# Patient Record
Sex: FEMALE | Race: WHITE | Employment: OTHER | URBAN - METROPOLITAN AREA
[De-identification: names, ages, dates, MRNs, and addresses within clinical notes are randomized per-mention and may not be internally consistent; named-entity substitution may affect disease eponyms.]

---

## 2017-01-26 PROCEDURE — 82570 ASSAY OF URINE CREATININE: CPT | Performed by: INTERNAL MEDICINE

## 2017-01-26 PROCEDURE — 82043 UR ALBUMIN QUANTITATIVE: CPT | Performed by: INTERNAL MEDICINE

## 2017-02-15 PROBLEM — E11.9 DIABETES MELLITUS TYPE 2 WITHOUT RETINOPATHY (HCC): Status: ACTIVE | Noted: 2017-02-15

## 2017-04-25 PROCEDURE — 86141 C-REACTIVE PROTEIN HS: CPT | Performed by: INTERNAL MEDICINE

## 2017-04-25 PROCEDURE — 86606 ASPERGILLUS ANTIBODY: CPT | Performed by: INTERNAL MEDICINE

## 2017-04-25 PROCEDURE — 86331 IMMUNODIFFUSION OUCHTERLONY: CPT | Performed by: INTERNAL MEDICINE

## 2017-05-18 RX ORDER — CHOLECALCIFEROL (VITAMIN D3) 50 MCG
TABLET ORAL DAILY
COMMUNITY

## 2017-05-18 RX ORDER — BIOTIN 1 MG
1000 TABLET ORAL DAILY
COMMUNITY
End: 2018-02-28

## 2017-06-07 ENCOUNTER — APPOINTMENT (OUTPATIENT)
Dept: GENERAL RADIOLOGY | Facility: HOSPITAL | Age: 77
End: 2017-06-07
Attending: INTERNAL MEDICINE
Payer: MEDICARE

## 2017-06-07 ENCOUNTER — SURGERY (OUTPATIENT)
Age: 77
End: 2017-06-07

## 2017-06-07 ENCOUNTER — HOSPITAL ENCOUNTER (OUTPATIENT)
Facility: HOSPITAL | Age: 77
Setting detail: HOSPITAL OUTPATIENT SURGERY
Discharge: HOME | End: 2017-06-07
Attending: INTERNAL MEDICINE | Admitting: INTERNAL MEDICINE
Payer: MEDICARE

## 2017-06-07 VITALS
TEMPERATURE: 98 F | BODY MASS INDEX: 35.88 KG/M2 | RESPIRATION RATE: 16 BRPM | WEIGHT: 195 LBS | DIASTOLIC BLOOD PRESSURE: 46 MMHG | SYSTOLIC BLOOD PRESSURE: 118 MMHG | OXYGEN SATURATION: 96 % | HEART RATE: 61 BPM | HEIGHT: 62 IN

## 2017-06-07 LAB
BASOPHIL BRONCHIAL WASHING: 0 %
EOSINOPHIL BRONCHIAL WASHING: 3 %
GLUCOSE BLD-MCNC: 105 MG/DL (ref 65–99)
LYMPHOCYTE BRONCHIAL WASHING: 12 %
MON/MACROPHAGE BRONCHIAL WASH: 60 %
NEUTROPHILS BRONCHIAL WASHING: 25 %
RBC BRONCHIAL WASHING: 6775 /MM3
TOTAL CELLS COUNTED: 100
WBC BRONCHIAL WASHING: 185 /MM3

## 2017-06-07 PROCEDURE — 87206 SMEAR FLUORESCENT/ACID STAI: CPT | Performed by: INTERNAL MEDICINE

## 2017-06-07 PROCEDURE — 99152 MOD SED SAME PHYS/QHP 5/>YRS: CPT

## 2017-06-07 PROCEDURE — 0BBJ8ZX EXCISION OF LEFT LOWER LUNG LOBE, VIA NATURAL OR ARTIFICIAL OPENING ENDOSCOPIC, DIAGNOSTIC: ICD-10-PCS | Performed by: INTERNAL MEDICINE

## 2017-06-07 PROCEDURE — 89050 BODY FLUID CELL COUNT: CPT | Performed by: INTERNAL MEDICINE

## 2017-06-07 PROCEDURE — 87116 MYCOBACTERIA CULTURE: CPT | Performed by: INTERNAL MEDICINE

## 2017-06-07 PROCEDURE — 87205 SMEAR GRAM STAIN: CPT | Performed by: INTERNAL MEDICINE

## 2017-06-07 PROCEDURE — 87102 FUNGUS ISOLATION CULTURE: CPT | Performed by: INTERNAL MEDICINE

## 2017-06-07 PROCEDURE — 0B9J8ZX DRAINAGE OF LEFT LOWER LUNG LOBE, VIA NATURAL OR ARTIFICIAL OPENING ENDOSCOPIC, DIAGNOSTIC: ICD-10-PCS | Performed by: INTERNAL MEDICINE

## 2017-06-07 PROCEDURE — 0BBF8ZX EXCISION OF RIGHT LOWER LUNG LOBE, VIA NATURAL OR ARTIFICIAL OPENING ENDOSCOPIC, DIAGNOSTIC: ICD-10-PCS | Performed by: INTERNAL MEDICINE

## 2017-06-07 PROCEDURE — 87070 CULTURE OTHR SPECIMN AEROBIC: CPT | Performed by: INTERNAL MEDICINE

## 2017-06-07 PROCEDURE — 82962 GLUCOSE BLOOD TEST: CPT

## 2017-06-07 PROCEDURE — 89051 BODY FLUID CELL COUNT: CPT | Performed by: INTERNAL MEDICINE

## 2017-06-07 PROCEDURE — 88305 TISSUE EXAM BY PATHOLOGIST: CPT | Performed by: INTERNAL MEDICINE

## 2017-06-07 PROCEDURE — 71010 XR CHEST AP PORTABLE  (CPT=71010): CPT | Performed by: INTERNAL MEDICINE

## 2017-06-07 RX ORDER — SODIUM CHLORIDE 9 MG/ML
INJECTION, SOLUTION INTRAVENOUS ONCE
Status: COMPLETED | OUTPATIENT
Start: 2017-06-07 | End: 2017-06-07

## 2017-06-07 RX ORDER — LIDOCAINE HYDROCHLORIDE 20 MG/ML
INJECTION, SOLUTION INFILTRATION; PERINEURAL
Status: DISCONTINUED | OUTPATIENT
Start: 2017-06-07 | End: 2017-06-07

## 2017-06-07 RX ORDER — SODIUM CHLORIDE, SODIUM LACTATE, POTASSIUM CHLORIDE, CALCIUM CHLORIDE 600; 310; 30; 20 MG/100ML; MG/100ML; MG/100ML; MG/100ML
INJECTION, SOLUTION INTRAVENOUS CONTINUOUS
Status: DISCONTINUED | OUTPATIENT
Start: 2017-06-07 | End: 2017-06-07

## 2017-06-07 NOTE — OPERATIVE REPORT
[facility]    Christine Smith Patient Status:  Hospital Outpatient Surgery    1959 MRN [id number]   [facility] ENDOSCOPY Attending Maite Moore MD   Hosp Day # 0 PCP Felix Jones MD     Bronchoscopy Procedure Note    Pro Bronchoscopic brushings were performed in the left lower lobe with a micro brush. Transbronchial biopsies were taken from the left lower lobe.  After airway inspection, LLL brushings, LLL BAL, and LLL transbronchial biopsies were performed, the scope was sl

## 2017-06-07 NOTE — H&P
Preprocedure H&P    HPI: Yaquelin Maloney is a 64year old female who is here for bronchoscopy. She has had a persistent cough over the past several months, despite treatments with antibiotics, steroids, and bronchodilators.  She has been noted to have agatha INTRAOCULAR LENS IMPLANT 83731;  Surgeon: Chacha Khan MD;  Location: [address] CATARACT EXTRACAP,INSERT LENS  3/26/2014    Comment Procedure: RIGHT PHACOEMULSIFICATION OF CATARACT WITH INTRAOCULAR LENS IMPLANT [id number];  Surgeon:  Rachana Chavez by mouth daily with breakfast. Disp: 90 tablet Rfl: 3 6/6/2017   hydrochlorothiazide 25 MG Oral Tab Take 1 tablet (25 mg total) by mouth daily. Disp: 90 tablet Rfl: 3 6/6/2017   Lovastatin 40 MG Oral Tab Take 1 tablet (40 mg total) by mouth nightly.  Disp: moderate sedation. Zurdo Augustine M.D.   Pulmonary/Critical Care

## 2017-08-03 PROCEDURE — 82570 ASSAY OF URINE CREATININE: CPT | Performed by: INTERNAL MEDICINE

## 2017-08-03 PROCEDURE — 82043 UR ALBUMIN QUANTITATIVE: CPT | Performed by: INTERNAL MEDICINE

## 2017-08-03 PROCEDURE — 81001 URINALYSIS AUTO W/SCOPE: CPT | Performed by: INTERNAL MEDICINE

## 2017-08-03 PROCEDURE — 82746 ASSAY OF FOLIC ACID SERUM: CPT | Performed by: INTERNAL MEDICINE

## 2017-08-03 PROCEDURE — 87086 URINE CULTURE/COLONY COUNT: CPT | Performed by: INTERNAL MEDICINE

## 2017-08-03 PROCEDURE — 82607 VITAMIN B-12: CPT | Performed by: INTERNAL MEDICINE

## 2017-10-17 PROCEDURE — 87086 URINE CULTURE/COLONY COUNT: CPT | Performed by: UROLOGY

## 2017-10-17 PROCEDURE — 87186 SC STD MICRODIL/AGAR DIL: CPT | Performed by: UROLOGY

## 2017-10-17 PROCEDURE — 87077 CULTURE AEROBIC IDENTIFY: CPT | Performed by: UROLOGY

## 2017-11-18 PROBLEM — E66.01 CLASS 2 SEVERE OBESITY DUE TO EXCESS CALORIES WITH SERIOUS COMORBIDITY AND BODY MASS INDEX (BMI) OF 39.0 TO 39.9 IN ADULT (HCC): Status: ACTIVE | Noted: 2017-11-18

## 2017-11-18 PROBLEM — E66.01 CLASS 2 SEVERE OBESITY DUE TO EXCESS CALORIES WITH SERIOUS COMORBIDITY AND BODY MASS INDEX (BMI) OF 39.0 TO 39.9 IN ADULT (HCC): Status: RESOLVED | Noted: 2017-11-18 | Resolved: 2017-11-18

## 2017-12-12 ENCOUNTER — CARDPULM VISIT (OUTPATIENT)
Dept: CARDIAC REHAB | Facility: HOSPITAL | Age: 77
End: 2017-12-12
Attending: INTERNAL MEDICINE
Payer: MEDICARE

## 2017-12-12 VITALS
BODY MASS INDEX: 39.09 KG/M2 | OXYGEN SATURATION: 97 % | SYSTOLIC BLOOD PRESSURE: 134 MMHG | HEART RATE: 70 BPM | WEIGHT: 220.63 LBS | DIASTOLIC BLOOD PRESSURE: 56 MMHG | HEIGHT: 63 IN | RESPIRATION RATE: 16 BRPM

## 2017-12-18 ENCOUNTER — CARDPULM VISIT (OUTPATIENT)
Dept: CARDIAC REHAB | Facility: HOSPITAL | Age: 77
End: 2017-12-18
Attending: INTERNAL MEDICINE
Payer: MEDICARE

## 2017-12-20 ENCOUNTER — CARDPULM VISIT (OUTPATIENT)
Dept: CARDIAC REHAB | Facility: HOSPITAL | Age: 77
End: 2017-12-20
Attending: INTERNAL MEDICINE
Payer: MEDICARE

## 2017-12-22 ENCOUNTER — CARDPULM VISIT (OUTPATIENT)
Dept: CARDIAC REHAB | Facility: HOSPITAL | Age: 77
End: 2017-12-22
Attending: INTERNAL MEDICINE
Payer: MEDICARE

## 2017-12-27 ENCOUNTER — CARDPULM VISIT (OUTPATIENT)
Dept: CARDIAC REHAB | Facility: HOSPITAL | Age: 77
End: 2017-12-27
Attending: INTERNAL MEDICINE
Payer: MEDICARE

## 2017-12-29 ENCOUNTER — CARDPULM VISIT (OUTPATIENT)
Dept: CARDIAC REHAB | Facility: HOSPITAL | Age: 77
End: 2017-12-29
Attending: INTERNAL MEDICINE
Payer: MEDICARE

## 2018-01-03 ENCOUNTER — CARDPULM VISIT (OUTPATIENT)
Dept: CARDIAC REHAB | Facility: HOSPITAL | Age: 78
End: 2018-01-03
Attending: INTERNAL MEDICINE
Payer: MEDICARE

## 2018-01-05 ENCOUNTER — CARDPULM VISIT (OUTPATIENT)
Dept: CARDIAC REHAB | Facility: HOSPITAL | Age: 78
End: 2018-01-05
Attending: INTERNAL MEDICINE
Payer: MEDICARE

## 2018-01-10 ENCOUNTER — APPOINTMENT (OUTPATIENT)
Dept: CARDIAC REHAB | Facility: HOSPITAL | Age: 78
End: 2018-01-10
Attending: INTERNAL MEDICINE
Payer: MEDICARE

## 2018-01-12 ENCOUNTER — APPOINTMENT (OUTPATIENT)
Dept: CARDIAC REHAB | Facility: HOSPITAL | Age: 78
End: 2018-01-12
Attending: INTERNAL MEDICINE
Payer: MEDICARE

## 2018-01-17 ENCOUNTER — CARDPULM VISIT (OUTPATIENT)
Dept: CARDIAC REHAB | Facility: HOSPITAL | Age: 78
End: 2018-01-17
Attending: INTERNAL MEDICINE
Payer: MEDICARE

## 2018-01-19 ENCOUNTER — CARDPULM VISIT (OUTPATIENT)
Dept: CARDIAC REHAB | Facility: HOSPITAL | Age: 78
End: 2018-01-19
Attending: INTERNAL MEDICINE
Payer: MEDICARE

## 2018-01-22 ENCOUNTER — CARDPULM VISIT (OUTPATIENT)
Dept: CARDIAC REHAB | Facility: HOSPITAL | Age: 78
End: 2018-01-22
Attending: INTERNAL MEDICINE
Payer: MEDICARE

## 2018-01-24 ENCOUNTER — CARDPULM VISIT (OUTPATIENT)
Dept: CARDIAC REHAB | Facility: HOSPITAL | Age: 78
End: 2018-01-24
Attending: INTERNAL MEDICINE
Payer: MEDICARE

## 2018-01-26 ENCOUNTER — CARDPULM VISIT (OUTPATIENT)
Dept: CARDIAC REHAB | Facility: HOSPITAL | Age: 78
End: 2018-01-26
Attending: INTERNAL MEDICINE
Payer: MEDICARE

## 2018-01-31 ENCOUNTER — CARDPULM VISIT (OUTPATIENT)
Dept: CARDIAC REHAB | Facility: HOSPITAL | Age: 78
End: 2018-01-31
Attending: INTERNAL MEDICINE
Payer: MEDICARE

## 2018-02-02 ENCOUNTER — CARDPULM VISIT (OUTPATIENT)
Dept: CARDIAC REHAB | Facility: HOSPITAL | Age: 78
End: 2018-02-02
Attending: INTERNAL MEDICINE
Payer: MEDICARE

## 2018-02-05 ENCOUNTER — CARDPULM VISIT (OUTPATIENT)
Dept: CARDIAC REHAB | Facility: HOSPITAL | Age: 78
End: 2018-02-05
Attending: INTERNAL MEDICINE
Payer: MEDICARE

## 2018-02-07 ENCOUNTER — CARDPULM VISIT (OUTPATIENT)
Dept: CARDIAC REHAB | Facility: HOSPITAL | Age: 78
End: 2018-02-07
Attending: INTERNAL MEDICINE
Payer: MEDICARE

## 2018-02-12 ENCOUNTER — CARDPULM VISIT (OUTPATIENT)
Dept: CARDIAC REHAB | Facility: HOSPITAL | Age: 78
End: 2018-02-12
Attending: INTERNAL MEDICINE
Payer: MEDICARE

## 2018-02-14 ENCOUNTER — CARDPULM VISIT (OUTPATIENT)
Dept: CARDIAC REHAB | Facility: HOSPITAL | Age: 78
End: 2018-02-14
Attending: INTERNAL MEDICINE
Payer: MEDICARE

## 2018-02-16 ENCOUNTER — CARDPULM VISIT (OUTPATIENT)
Dept: CARDIAC REHAB | Facility: HOSPITAL | Age: 78
End: 2018-02-16
Attending: INTERNAL MEDICINE
Payer: MEDICARE

## 2018-02-19 ENCOUNTER — CARDPULM VISIT (OUTPATIENT)
Dept: CARDIAC REHAB | Facility: HOSPITAL | Age: 78
End: 2018-02-19
Attending: INTERNAL MEDICINE
Payer: MEDICARE

## 2018-02-21 ENCOUNTER — CARDPULM VISIT (OUTPATIENT)
Dept: CARDIAC REHAB | Facility: HOSPITAL | Age: 78
End: 2018-02-21
Attending: INTERNAL MEDICINE
Payer: MEDICARE

## 2018-02-23 ENCOUNTER — CARDPULM VISIT (OUTPATIENT)
Dept: CARDIAC REHAB | Facility: HOSPITAL | Age: 78
End: 2018-02-23
Attending: INTERNAL MEDICINE
Payer: MEDICARE

## 2018-02-26 ENCOUNTER — CARDPULM VISIT (OUTPATIENT)
Dept: CARDIAC REHAB | Facility: HOSPITAL | Age: 78
End: 2018-02-26
Attending: INTERNAL MEDICINE
Payer: MEDICARE

## 2018-02-28 ENCOUNTER — CARDPULM VISIT (OUTPATIENT)
Dept: CARDIAC REHAB | Facility: HOSPITAL | Age: 78
End: 2018-02-28
Attending: INTERNAL MEDICINE
Payer: MEDICARE

## 2018-02-28 PROBLEM — E11.65 UNCONTROLLED TYPE 2 DIABETES MELLITUS WITH HYPERGLYCEMIA, WITHOUT LONG-TERM CURRENT USE OF INSULIN (HCC): Status: ACTIVE | Noted: 2018-02-28

## 2018-02-28 PROBLEM — I15.2 HYPERTENSION ASSOCIATED WITH DIABETES (HCC): Status: ACTIVE | Noted: 2018-02-28

## 2018-02-28 PROBLEM — E11.69 HYPERLIPIDEMIA ASSOCIATED WITH TYPE 2 DIABETES MELLITUS (HCC): Status: ACTIVE | Noted: 2018-02-28

## 2018-02-28 PROBLEM — I70.0 AORTIC ATHEROSCLEROSIS (HCC): Status: ACTIVE | Noted: 2018-02-28

## 2018-02-28 PROBLEM — E78.5 HYPERLIPIDEMIA ASSOCIATED WITH TYPE 2 DIABETES MELLITUS (HCC): Status: ACTIVE | Noted: 2018-02-28

## 2018-02-28 PROBLEM — E66.01 OBESITY, MORBID, BMI 40.0-49.9 (HCC): Status: ACTIVE | Noted: 2017-11-18

## 2018-02-28 PROBLEM — E11.59 HYPERTENSION ASSOCIATED WITH DIABETES (HCC): Status: ACTIVE | Noted: 2018-02-28

## 2018-02-28 PROBLEM — J84.9 ILD (INTERSTITIAL LUNG DISEASE) (HCC): Status: ACTIVE | Noted: 2018-02-28

## 2018-03-02 ENCOUNTER — CARDPULM VISIT (OUTPATIENT)
Dept: CARDIAC REHAB | Facility: HOSPITAL | Age: 78
End: 2018-03-02
Attending: INTERNAL MEDICINE
Payer: MEDICARE

## 2018-03-07 ENCOUNTER — CARDPULM VISIT (OUTPATIENT)
Dept: CARDIAC REHAB | Facility: HOSPITAL | Age: 78
End: 2018-03-07
Attending: INTERNAL MEDICINE
Payer: MEDICARE

## 2018-03-08 ENCOUNTER — APPOINTMENT (OUTPATIENT)
Dept: ULTRASOUND IMAGING | Facility: HOSPITAL | Age: 78
End: 2018-03-08
Attending: HOSPITALIST
Payer: MEDICARE

## 2018-03-08 ENCOUNTER — HOSPITAL ENCOUNTER (OUTPATIENT)
Facility: HOSPITAL | Age: 78
Setting detail: OBSERVATION
Discharge: HOME | End: 2018-03-09
Attending: HOSPITALIST | Admitting: HOSPITALIST
Payer: MEDICARE

## 2018-03-08 ENCOUNTER — APPOINTMENT (OUTPATIENT)
Dept: CT IMAGING | Facility: HOSPITAL | Age: 78
End: 2018-03-08
Attending: HOSPITALIST
Payer: MEDICARE

## 2018-03-08 PROBLEM — R07.9 CHEST PAIN: Status: ACTIVE | Noted: 2018-03-08

## 2018-03-08 LAB
ATRIAL RATE: 69 BPM
BASOPHILS # BLD AUTO: 0.02 X10(3) UL (ref 0–0.1)
BASOPHILS NFR BLD AUTO: 0.2 %
BUN BLD-MCNC: 17 MG/DL (ref 8–20)
CALCIUM BLD-MCNC: 8.7 MG/DL (ref 8.3–10.3)
CHLORIDE: 106 MMOL/L (ref 101–111)
CO2: 28 MMOL/L (ref 22–32)
CREAT BLD-MCNC: 0.61 MG/DL (ref 0.55–1.02)
D-DIMER: 1.1 UG/ML FEU (ref 0–0.49)
EOSINOPHIL # BLD AUTO: 0.03 X10(3) UL (ref 0–0.3)
EOSINOPHIL NFR BLD AUTO: 0.4 %
ERYTHROCYTE [DISTWIDTH] IN BLOOD BY AUTOMATED COUNT: 12.5 % (ref 11.5–16)
GLUCOSE BLD-MCNC: 111 MG/DL (ref 65–99)
GLUCOSE BLD-MCNC: 124 MG/DL (ref 65–99)
GLUCOSE BLD-MCNC: 124 MG/DL (ref 70–99)
GLUCOSE BLD-MCNC: 127 MG/DL (ref 65–99)
HCT VFR BLD AUTO: 34.4 % (ref 34–50)
HGB BLD-MCNC: 11.2 G/DL (ref 12–16)
IMMATURE GRANULOCYTE COUNT: 0.04 X10(3) UL (ref 0–1)
IMMATURE GRANULOCYTE RATIO %: 0.5 %
LYMPHOCYTES # BLD AUTO: 1.27 X10(3) UL (ref 0.9–4)
LYMPHOCYTES NFR BLD AUTO: 15 %
MCH RBC QN AUTO: 30.5 PG (ref 27–33.2)
MCHC RBC AUTO-ENTMCNC: 32.6 G/DL (ref 31–37)
MCV RBC AUTO: 93.7 FL (ref 81–100)
MONOCYTES # BLD AUTO: 0.94 X10(3) UL (ref 0.1–1)
MONOCYTES NFR BLD AUTO: 11.1 %
NEUTROPHIL ABS PRELIM: 6.18 X10 (3) UL (ref 1.3–6.7)
NEUTROPHILS # BLD AUTO: 6.18 X10(3) UL (ref 1.3–6.7)
NEUTROPHILS NFR BLD AUTO: 72.8 %
P AXIS: 60 DEGREES
P-R INTERVAL: 196 MS
PLATELET # BLD AUTO: 235 10(3)UL (ref 150–450)
POTASSIUM SERPL-SCNC: 3.7 MMOL/L (ref 3.6–5.1)
Q-T INTERVAL: 416 MS
QRS DURATION: 96 MS
QTC CALCULATION (BEZET): 445 MS
R AXIS: 45 DEGREES
RBC # BLD AUTO: 3.67 X10(6)UL (ref 3.8–5.1)
RED CELL DISTRIBUTION WIDTH-SD: 43.7 FL (ref 35.1–46.3)
SODIUM SERPL-SCNC: 140 MMOL/L (ref 136–144)
T AXIS: 54 DEGREES
TROPONIN: <0.046 NG/ML (ref ?–0.05)
VENTRICULAR RATE: 69 BPM
WBC # BLD AUTO: 8.5 X10(3) UL (ref 4–13)

## 2018-03-08 PROCEDURE — 71275 CT ANGIOGRAPHY CHEST: CPT | Performed by: HOSPITALIST

## 2018-03-08 PROCEDURE — 93010 ELECTROCARDIOGRAM REPORT: CPT | Performed by: INTERNAL MEDICINE

## 2018-03-08 PROCEDURE — 85025 COMPLETE CBC W/AUTO DIFF WBC: CPT | Performed by: HOSPITALIST

## 2018-03-08 PROCEDURE — 84484 ASSAY OF TROPONIN QUANT: CPT | Performed by: HOSPITALIST

## 2018-03-08 PROCEDURE — 85378 FIBRIN DEGRADE SEMIQUANT: CPT | Performed by: HOSPITALIST

## 2018-03-08 PROCEDURE — 80048 BASIC METABOLIC PNL TOTAL CA: CPT | Performed by: HOSPITALIST

## 2018-03-08 PROCEDURE — 93005 ELECTROCARDIOGRAM TRACING: CPT

## 2018-03-08 PROCEDURE — 93970 EXTREMITY STUDY: CPT | Performed by: HOSPITALIST

## 2018-03-08 PROCEDURE — 82962 GLUCOSE BLOOD TEST: CPT

## 2018-03-08 RX ORDER — HYDROCHLOROTHIAZIDE 25 MG/1
25 TABLET ORAL DAILY
Status: DISCONTINUED | OUTPATIENT
Start: 2018-03-09 | End: 2018-03-09

## 2018-03-08 RX ORDER — ANASTROZOLE 1 MG/1
1 TABLET ORAL DAILY
Status: DISCONTINUED | OUTPATIENT
Start: 2018-03-08 | End: 2018-03-09

## 2018-03-08 RX ORDER — ACETAMINOPHEN 325 MG/1
650 TABLET ORAL EVERY 6 HOURS PRN
Status: DISCONTINUED | OUTPATIENT
Start: 2018-03-08 | End: 2018-03-09

## 2018-03-08 RX ORDER — BENAZEPRIL HYDROCHLORIDE 10 MG/1
10 TABLET ORAL DAILY
COMMUNITY
End: 2019-04-26

## 2018-03-08 RX ORDER — KETOROLAC TROMETHAMINE 30 MG/ML
15 INJECTION, SOLUTION INTRAMUSCULAR; INTRAVENOUS ONCE
Status: COMPLETED | OUTPATIENT
Start: 2018-03-08 | End: 2018-03-08

## 2018-03-08 RX ORDER — ASPIRIN 81 MG/1
81 TABLET, CHEWABLE ORAL DAILY
Status: DISCONTINUED | OUTPATIENT
Start: 2018-03-08 | End: 2018-03-09

## 2018-03-08 RX ORDER — ALBUTEROL SULFATE 90 UG/1
2 AEROSOL, METERED RESPIRATORY (INHALATION) EVERY 6 HOURS PRN
Status: DISCONTINUED | OUTPATIENT
Start: 2018-03-08 | End: 2018-03-09

## 2018-03-08 RX ORDER — ATORVASTATIN CALCIUM 10 MG/1
10 TABLET, FILM COATED ORAL NIGHTLY
Status: DISCONTINUED | OUTPATIENT
Start: 2018-03-08 | End: 2018-03-09

## 2018-03-08 RX ORDER — ACETAMINOPHEN 160 MG
2000 TABLET,DISINTEGRATING ORAL DAILY
Status: DISCONTINUED | OUTPATIENT
Start: 2018-03-08 | End: 2018-03-09

## 2018-03-08 RX ORDER — LISINOPRIL 10 MG/1
10 TABLET ORAL DAILY
Status: DISCONTINUED | OUTPATIENT
Start: 2018-03-08 | End: 2018-03-09

## 2018-03-08 RX ORDER — FLUTICASONE PROPIONATE 50 MCG
2 SPRAY, SUSPENSION (ML) NASAL DAILY
Status: DISCONTINUED | OUTPATIENT
Start: 2018-03-08 | End: 2018-03-09

## 2018-03-08 RX ORDER — KETOROLAC TROMETHAMINE 30 MG/ML
15 INJECTION, SOLUTION INTRAMUSCULAR; INTRAVENOUS EVERY 6 HOURS PRN
Status: DISCONTINUED | OUTPATIENT
Start: 2018-03-08 | End: 2018-03-09

## 2018-03-08 RX ORDER — MORPHINE SULFATE 4 MG/ML
1 INJECTION, SOLUTION INTRAMUSCULAR; INTRAVENOUS EVERY 4 HOURS PRN
Status: DISCONTINUED | OUTPATIENT
Start: 2018-03-08 | End: 2018-03-09

## 2018-03-08 RX ORDER — POTASSIUM CHLORIDE 20 MEQ/1
40 TABLET, EXTENDED RELEASE ORAL ONCE
Status: COMPLETED | OUTPATIENT
Start: 2018-03-08 | End: 2018-03-08

## 2018-03-08 RX ORDER — ONDANSETRON 2 MG/ML
4 INJECTION INTRAMUSCULAR; INTRAVENOUS EVERY 6 HOURS PRN
Status: DISCONTINUED | OUTPATIENT
Start: 2018-03-08 | End: 2018-03-09

## 2018-03-08 RX ORDER — DEXTROSE MONOHYDRATE 25 G/50ML
50 INJECTION, SOLUTION INTRAVENOUS
Status: DISCONTINUED | OUTPATIENT
Start: 2018-03-08 | End: 2018-03-09

## 2018-03-08 RX ORDER — AMOXICILLIN 250 MG
1 CAPSULE ORAL DAILY
COMMUNITY
End: 2019-03-20

## 2018-03-08 RX ORDER — LOSARTAN POTASSIUM 50 MG/1
50 TABLET ORAL DAILY
Status: DISCONTINUED | OUTPATIENT
Start: 2018-03-08 | End: 2018-03-09

## 2018-03-08 NOTE — H&P
[facility]  History & Physical    [facility] Patient Status:  Observation    10/27/1940 MRN [id number]   [facility] 8NE-A Attending Eva Chacko MD   [address] Day # 0 PCP Ana Morales MD     Cc: chest pain    History of Present I 9/12/2016   • High blood pressure    • History of blood transfusion    • Hyperlipidemia    • Hypertension    • [facility]    • [facility] 2/28/2018   • KIDNEY STONE     lithotripsy     [facility]  3/26/2014: [facility] CATARACT EXTRACAP,INSERT LENS      Comment: Procedure: RIGHT PHACOEMULSIFICATION OF                CATARACT WITH INTRAOCULAR LENS IMPLANT [id number];                 Surgeon:  Lois Ho MD;  Location: [address] [place] [address] for 2-3 weeks, then PRN flares. Disp: 120 mL Rfl: 3 Past Week at Unknown time   hydrochlorothiazide 25 MG Oral Tab Take 1 tablet (25 mg total) by mouth daily.  Disp: 90 tablet Rfl: 3 3/7/2018 at 0900   Glucose Blood In Vitro Strip tests one time daily Disp: all 4 extremities, gait deferred   Psychiatric: appropriate mood and affect        Data Review:     Recent Labs   Lab  03/08/18   1053   WBC  8.5   HGB  11.2*   MCV  93.7   PLT  235.0       Recent Labs   Lab  03/08/18   1053   NA  140   K  3.7   CL  106 Afebrile.  HR 73. Neg troponin at OSH.      Gen: No acute distress, alert and oriented   Pulm: Lungs clear bilaterally, good inspiratory effort   CV:  nL S1/S2  Abd: soft, NT/ND, no hepatomegaly, +BS  MSK: moving all extremities, no edema  Skin: no r

## 2018-03-08 NOTE — HISTORICAL OFFICE NOTE
Kavon Hernandez  : 10/27/1940  ACCOUNT:  [id number]  [id number]  PCP: Dr. Karie Snellen     TODAY'S DATE: 2013  DICTATED BY:  Tamika Stevenson MD]    CHIEF COMPLAINT: [Followup of Pre-operative evaluation.]    HPI:  [On 2013, nerissa Daly xanthelasma. ENT: mucosa pink and moist. NECK: jugular venous pressure not elevated. RESP: respirations with normal rate and rhythm, clear to auscultation. GI: no masses, tenderness or hepatosplenomegaly, rectal deferred.  MS: adequate gait for exercise/fanny by mouth daily                         07/23/13 Lovastatin            40MG      1 by mouth daily                         07/05/13 METFORMIN  MG T                                                   06/05/13 ENABLEX 15 MG TABLET

## 2018-03-08 NOTE — CONSULTS
[facility]  Report of Consultation    Jenny Uriarte Patient Status:  Observation    10/27/1940 MRN [id number]   [facility] 8NE-A Attending Blanca Russell MD   Hosp Day # 0 PCP Paul Kerr MD     Reason for Consultation:  CP   07/20/16: OTHER SURGICAL HISTORY      Comment: flow/cysto-Dr Dominga Marie  2/26/2014: PATIENT DOCUMENTED NOT TO HAVE EXPERIENCED ANY*      Comment: Procedure: LEFT PHACOEMULSIFICATION OF                CATARACT WITH INTRAOCULAR LENS IMPLANT [id number]; reports that she drinks alcohol. She reports that she does not use drugs.     Allergies:  No Known Allergies    Medications:    Current Facility-Administered Medications:   •  ondansetron HCl (ZOFRAN) injection 4 mg, 4 mg, Intravenous, Q6H PRN  •  acetamino of Systems:  All systems were reviewed and are negative except as described above in HPI. Physical Exam:  Blood pressure 154/71, pulse 89, temperature 98 °F (36.7 °C), temperature source Oral, resp.  rate 20, weight 212 lb 11.9 oz (96.5 kg), SpO2 98 %, n

## 2018-03-08 NOTE — PLAN OF CARE
Assumed care of patient around 0930, was transferred from [facility] d/t CP and was asked to be transferred by patient, courtney neg X2 at [facility].   Alert and oriented X4, no c/o CP/SOB, her chest pain is more muscular or with a certain movement per patie

## 2018-03-09 ENCOUNTER — APPOINTMENT (OUTPATIENT)
Dept: CV DIAGNOSTICS | Facility: HOSPITAL | Age: 78
End: 2018-03-09
Attending: INTERNAL MEDICINE
Payer: MEDICARE

## 2018-03-09 ENCOUNTER — APPOINTMENT (OUTPATIENT)
Dept: CARDIAC REHAB | Facility: HOSPITAL | Age: 78
End: 2018-03-09
Attending: INTERNAL MEDICINE
Payer: MEDICARE

## 2018-03-09 ENCOUNTER — APPOINTMENT (OUTPATIENT)
Dept: CV DIAGNOSTICS | Facility: HOSPITAL | Age: 78
End: 2018-03-09
Attending: HOSPITALIST
Payer: MEDICARE

## 2018-03-09 VITALS
OXYGEN SATURATION: 95 % | SYSTOLIC BLOOD PRESSURE: 102 MMHG | HEART RATE: 62 BPM | RESPIRATION RATE: 18 BRPM | BODY MASS INDEX: 40 KG/M2 | WEIGHT: 212.75 LBS | TEMPERATURE: 98 F | DIASTOLIC BLOOD PRESSURE: 47 MMHG

## 2018-03-09 LAB
BASOPHILS # BLD AUTO: 0.04 X10(3) UL (ref 0–0.1)
BASOPHILS NFR BLD AUTO: 0.7 %
EOSINOPHIL # BLD AUTO: 0.18 X10(3) UL (ref 0–0.3)
EOSINOPHIL NFR BLD AUTO: 3.1 %
ERYTHROCYTE [DISTWIDTH] IN BLOOD BY AUTOMATED COUNT: 12.7 % (ref 11.5–16)
GLUCOSE BLD-MCNC: 116 MG/DL (ref 65–99)
GLUCOSE BLD-MCNC: 137 MG/DL (ref 65–99)
HCT VFR BLD AUTO: 36 % (ref 34–50)
HGB BLD-MCNC: 12.2 G/DL (ref 12–16)
IMMATURE GRANULOCYTE COUNT: 0.02 X10(3) UL (ref 0–1)
IMMATURE GRANULOCYTE RATIO %: 0.3 %
LYMPHOCYTES # BLD AUTO: 1.11 X10(3) UL (ref 0.9–4)
LYMPHOCYTES NFR BLD AUTO: 19.1 %
MCH RBC QN AUTO: 31.7 PG (ref 27–33.2)
MCHC RBC AUTO-ENTMCNC: 33.9 G/DL (ref 31–37)
MCV RBC AUTO: 93.5 FL (ref 81–100)
MONOCYTES # BLD AUTO: 0.58 X10(3) UL (ref 0.1–1)
MONOCYTES NFR BLD AUTO: 10 %
NEUTROPHIL ABS PRELIM: 3.88 X10 (3) UL (ref 1.3–6.7)
NEUTROPHILS # BLD AUTO: 3.88 X10(3) UL (ref 1.3–6.7)
NEUTROPHILS NFR BLD AUTO: 66.8 %
PLATELET # BLD AUTO: 235 10(3)UL (ref 150–450)
POTASSIUM SERPL-SCNC: 4.2 MMOL/L (ref 3.6–5.1)
PROCALCITONIN SERPL-MCNC: <0.11 NG/ML (ref ?–0.11)
RBC # BLD AUTO: 3.85 X10(6)UL (ref 3.8–5.1)
RED CELL DISTRIBUTION WIDTH-SD: 43.5 FL (ref 35.1–46.3)
WBC # BLD AUTO: 5.8 X10(3) UL (ref 4–13)

## 2018-03-09 PROCEDURE — 85025 COMPLETE CBC W/AUTO DIFF WBC: CPT | Performed by: HOSPITALIST

## 2018-03-09 PROCEDURE — 93306 TTE W/DOPPLER COMPLETE: CPT | Performed by: HOSPITALIST

## 2018-03-09 PROCEDURE — 84145 PROCALCITONIN (PCT): CPT | Performed by: HOSPITALIST

## 2018-03-09 PROCEDURE — 84132 ASSAY OF SERUM POTASSIUM: CPT | Performed by: HOSPITALIST

## 2018-03-09 PROCEDURE — 82962 GLUCOSE BLOOD TEST: CPT

## 2018-03-09 NOTE — PROGRESS NOTES
· [facility] Progress Note     Subjective:  Chest pain resolved. Now only with deep inspiration.   Getting Echo now    Objective:  102/47  tmax 99.0   SR  I/O incomplete       Cardiac: S1 S2 regular  Lungs:  clear  Abdomen: soft  Extremities:

## 2018-03-09 NOTE — DISCHARGE SUMMARY
General Medicine Discharge Summary     Patient ID:  Ethel Carrel  76year old  10/27/1940    Admit date: 3/8/2018  Discharge date and time: 3/9/2018  Attending Physician: MD Aliza Alegre controlled with Ha1c of 6.7% on 2/26/18  - resume Metformin 48 hours after contrast - may restart 3/11 in am      # hx breast cancer  - s/p lumpectomy / radiation  - continue po Arimidex    Consults: IP CONSULT TO CARDIOLOGY  IP CONSULT TO RESPIRATORY CARE units Tabs        * This list has 2 medication(s) that are the same as other medications prescribed for you. Read the directions carefully, and ask your doctor or other care provider to review them with you.                 Activity: activity as tolerated

## 2018-03-09 NOTE — PLAN OF CARE
Assumed care of patient around 0730, alert and oriented X4, no c/o CP/SOB, SpO2 97 % on RA  Rhythm/HR: NSR 60s  Feeling well this morning. No need for pain meds at this time.  Walking in the hallway with no complaints    Plan: 2D echo    Echo- fine ok for d

## 2018-03-09 NOTE — PAYOR COMM NOTE
--------------  ADMISSION REVIEW     Payor: [employer]  Subscriber #:  [id number]  Authorization Number: N/A    Admit date: N/A  Admit time: N/A       Admitting Physician: Yue Stevens MD  Attending Physician:  Yue Stevens MD  Primary Care 03/08/18   1256   [facility]       Assessment / Plan:      Gilbert Simms is a 76year old female with PMH significant for breast cancer s/p right lumpectomy, radiation with subsequent ILD, T2DM, HTN, HL presenting to [facility] for chest pain. Losartan Potassium (COZAAR) tab 50 mg     Date Action Dose Route User    3/8/2018 1254 Given 50 mg Oral Valentin Hernandez, RN      Potassium Chloride ER (K-DUR M20) CR tab 40 mEq     Date Action Dose Route User    3/8/2018 1254 Given 40 mEq Oral Keary Mendon

## 2018-03-12 ENCOUNTER — CARDPULM VISIT (OUTPATIENT)
Dept: CARDIAC REHAB | Facility: HOSPITAL | Age: 78
End: 2018-03-12
Attending: INTERNAL MEDICINE
Payer: MEDICARE

## 2018-03-14 ENCOUNTER — CARDPULM VISIT (OUTPATIENT)
Dept: CARDIAC REHAB | Facility: HOSPITAL | Age: 78
End: 2018-03-14
Attending: INTERNAL MEDICINE
Payer: MEDICARE

## 2018-03-16 ENCOUNTER — CARDPULM VISIT (OUTPATIENT)
Dept: CARDIAC REHAB | Facility: HOSPITAL | Age: 78
End: 2018-03-16
Attending: INTERNAL MEDICINE
Payer: MEDICARE

## 2018-04-02 ENCOUNTER — CARDPULM VISIT (OUTPATIENT)
Dept: CARDIAC REHAB | Facility: HOSPITAL | Age: 78
End: 2018-04-02
Attending: INTERNAL MEDICINE
Payer: MEDICARE

## 2018-04-04 ENCOUNTER — CARDPULM VISIT (OUTPATIENT)
Dept: CARDIAC REHAB | Facility: HOSPITAL | Age: 78
End: 2018-04-04
Attending: INTERNAL MEDICINE
Payer: MEDICARE

## 2018-04-06 ENCOUNTER — CARDPULM VISIT (OUTPATIENT)
Dept: CARDIAC REHAB | Facility: HOSPITAL | Age: 78
End: 2018-04-06
Attending: INTERNAL MEDICINE
Payer: MEDICARE

## 2018-04-09 ENCOUNTER — CARDPULM VISIT (OUTPATIENT)
Dept: CARDIAC REHAB | Facility: HOSPITAL | Age: 78
End: 2018-04-09
Attending: INTERNAL MEDICINE
Payer: MEDICARE

## 2018-04-10 ENCOUNTER — CARDPULM VISIT (OUTPATIENT)
Dept: CARDIAC REHAB | Facility: HOSPITAL | Age: 78
End: 2018-04-10
Attending: INTERNAL MEDICINE
Payer: MEDICARE

## 2018-04-10 PROCEDURE — 94618 PULMONARY STRESS TESTING: CPT

## 2018-04-11 ENCOUNTER — CARDPULM VISIT (OUTPATIENT)
Dept: CARDIAC REHAB | Facility: HOSPITAL | Age: 78
End: 2018-04-11
Attending: INTERNAL MEDICINE
Payer: MEDICARE

## 2018-04-13 ENCOUNTER — CARDPULM VISIT (OUTPATIENT)
Dept: CARDIAC REHAB | Facility: HOSPITAL | Age: 78
End: 2018-04-13
Attending: INTERNAL MEDICINE
Payer: MEDICARE

## 2018-04-16 ENCOUNTER — APPOINTMENT (OUTPATIENT)
Dept: CARDIAC REHAB | Facility: HOSPITAL | Age: 78
End: 2018-04-16
Payer: MEDICARE

## 2019-03-20 PROBLEM — M85.89 OSTEOPENIA OF MULTIPLE SITES: Status: ACTIVE | Noted: 2019-03-20

## 2019-03-20 PROBLEM — C77.3 SECONDARY AND UNSPECIFIED MALIGNANT NEOPLASM OF AXILLA AND UPPER LIMB LYMPH NODES (HCC): Status: ACTIVE | Noted: 2019-03-20

## 2019-03-20 PROBLEM — E11.9 CONTROLLED TYPE 2 DIABETES MELLITUS WITHOUT COMPLICATION, WITHOUT LONG-TERM CURRENT USE OF INSULIN (HCC): Status: ACTIVE | Noted: 2019-03-20

## 2019-04-08 PROCEDURE — 87040 BLOOD CULTURE FOR BACTERIA: CPT | Performed by: EMERGENCY MEDICINE

## 2019-05-01 PROCEDURE — 87086 URINE CULTURE/COLONY COUNT: CPT | Performed by: UROLOGY

## 2019-05-01 PROCEDURE — 87186 SC STD MICRODIL/AGAR DIL: CPT | Performed by: UROLOGY

## 2019-05-01 PROCEDURE — 87088 URINE BACTERIA CULTURE: CPT | Performed by: UROLOGY

## 2019-05-29 PROCEDURE — 87086 URINE CULTURE/COLONY COUNT: CPT | Performed by: PHYSICIAN ASSISTANT

## 2019-05-29 PROCEDURE — 87186 SC STD MICRODIL/AGAR DIL: CPT | Performed by: PHYSICIAN ASSISTANT

## 2019-05-29 PROCEDURE — 87088 URINE BACTERIA CULTURE: CPT | Performed by: PHYSICIAN ASSISTANT

## 2019-05-29 PROCEDURE — 81015 MICROSCOPIC EXAM OF URINE: CPT | Performed by: PHYSICIAN ASSISTANT

## 2019-07-15 PROCEDURE — 87186 SC STD MICRODIL/AGAR DIL: CPT | Performed by: UROLOGY

## 2019-07-15 PROCEDURE — 81015 MICROSCOPIC EXAM OF URINE: CPT | Performed by: UROLOGY

## 2019-07-15 PROCEDURE — 87088 URINE BACTERIA CULTURE: CPT | Performed by: UROLOGY

## 2019-07-15 PROCEDURE — 87086 URINE CULTURE/COLONY COUNT: CPT | Performed by: UROLOGY

## 2019-08-14 PROCEDURE — 81001 URINALYSIS AUTO W/SCOPE: CPT | Performed by: INTERNAL MEDICINE

## 2019-08-14 PROCEDURE — 87086 URINE CULTURE/COLONY COUNT: CPT | Performed by: INTERNAL MEDICINE

## 2019-09-04 PROBLEM — R26.89 BALANCE DISORDER: Status: ACTIVE | Noted: 2019-09-04

## 2019-09-04 PROBLEM — R29.898 BILATERAL LEG WEAKNESS: Status: ACTIVE | Noted: 2019-09-04

## 2020-03-12 PROBLEM — E66.9 OBESITY: Status: ACTIVE | Noted: 2020-03-12

## 2020-03-12 PROBLEM — R29.898 BILATERAL LEG WEAKNESS: Status: RESOLVED | Noted: 2019-09-04 | Resolved: 2020-03-12

## 2020-03-12 PROBLEM — E66.01 OBESITY, MORBID, BMI 40.0-49.9 (HCC): Status: RESOLVED | Noted: 2017-11-18 | Resolved: 2020-03-12

## 2020-03-12 PROBLEM — R26.89 BALANCE DISORDER: Status: RESOLVED | Noted: 2019-09-04 | Resolved: 2020-03-12

## 2020-03-30 PROBLEM — E11.9 CONTROLLED TYPE 2 DIABETES MELLITUS WITHOUT COMPLICATION, WITHOUT LONG-TERM CURRENT USE OF INSULIN (HCC): Status: RESOLVED | Noted: 2019-03-20 | Resolved: 2020-03-30

## 2020-03-30 PROBLEM — E11.42 DIABETIC POLYNEUROPATHY ASSOCIATED WITH TYPE 2 DIABETES MELLITUS (HCC): Status: ACTIVE | Noted: 2020-03-30

## 2021-01-01 ENCOUNTER — APPOINTMENT (OUTPATIENT)
Dept: CV DIAGNOSTICS | Facility: HOSPITAL | Age: 81
End: 2021-01-01
Attending: INTERNAL MEDICINE
Payer: MEDICARE

## 2021-01-01 ENCOUNTER — NURSE ONLY (OUTPATIENT)
Dept: LAB | Age: 81
End: 2021-01-01
Attending: FAMILY MEDICINE
Payer: MEDICARE

## 2021-01-01 ENCOUNTER — APPOINTMENT (OUTPATIENT)
Dept: ULTRASOUND IMAGING | Facility: HOSPITAL | Age: 81
End: 2021-01-01
Attending: INTERNAL MEDICINE
Payer: MEDICARE

## 2021-01-01 ENCOUNTER — INITIAL APN SNF VISIT (OUTPATIENT)
Dept: INTERNAL MEDICINE CLINIC | Age: 81
End: 2021-01-01

## 2021-01-01 ENCOUNTER — SNF VISIT (OUTPATIENT)
Dept: INTERNAL MEDICINE CLINIC | Age: 81
End: 2021-01-01

## 2021-01-01 ENCOUNTER — HOSPITAL ENCOUNTER (OUTPATIENT)
Facility: HOSPITAL | Age: 81
Setting detail: OBSERVATION
LOS: 1 days | Discharge: NURSING, REHAB OR LONG-TERM CARE | End: 2021-01-01
Attending: INTERNAL MEDICINE | Admitting: INTERNAL MEDICINE
Payer: MEDICARE

## 2021-01-01 ENCOUNTER — EXTERNAL FACILITY (OUTPATIENT)
Dept: FAMILY MEDICINE CLINIC | Facility: CLINIC | Age: 81
End: 2021-01-01

## 2021-01-01 ENCOUNTER — SNF DISCHARGE (OUTPATIENT)
Dept: INTERNAL MEDICINE CLINIC | Age: 81
End: 2021-01-01

## 2021-01-01 VITALS
RESPIRATION RATE: 18 BRPM | OXYGEN SATURATION: 91 % | BODY MASS INDEX: 37 KG/M2 | TEMPERATURE: 97 F | SYSTOLIC BLOOD PRESSURE: 119 MMHG | HEART RATE: 69 BPM | WEIGHT: 208.88 LBS | DIASTOLIC BLOOD PRESSURE: 69 MMHG

## 2021-01-01 VITALS
SYSTOLIC BLOOD PRESSURE: 121 MMHG | RESPIRATION RATE: 18 BRPM | TEMPERATURE: 97 F | OXYGEN SATURATION: 96 % | HEART RATE: 57 BPM | BODY MASS INDEX: 37 KG/M2 | DIASTOLIC BLOOD PRESSURE: 62 MMHG | WEIGHT: 209.19 LBS

## 2021-01-01 VITALS
SYSTOLIC BLOOD PRESSURE: 118 MMHG | BODY MASS INDEX: 37 KG/M2 | WEIGHT: 209.63 LBS | DIASTOLIC BLOOD PRESSURE: 66 MMHG | HEART RATE: 64 BPM | RESPIRATION RATE: 18 BRPM | OXYGEN SATURATION: 97 % | TEMPERATURE: 97 F

## 2021-01-01 VITALS
DIASTOLIC BLOOD PRESSURE: 45 MMHG | BODY MASS INDEX: 37 KG/M2 | RESPIRATION RATE: 18 BRPM | HEART RATE: 66 BPM | OXYGEN SATURATION: 99 % | WEIGHT: 207.81 LBS | TEMPERATURE: 98 F | SYSTOLIC BLOOD PRESSURE: 106 MMHG

## 2021-01-01 DIAGNOSIS — N32.81 OAB (OVERACTIVE BLADDER): ICD-10-CM

## 2021-01-01 DIAGNOSIS — I26.99 IATROGENIC PULMONARY EMBOLISM AND INFARCTION (HCC): Primary | ICD-10-CM

## 2021-01-01 DIAGNOSIS — D64.9 MILD ANEMIA: ICD-10-CM

## 2021-01-01 DIAGNOSIS — E78.5 HYPERLIPIDEMIA ASSOCIATED WITH TYPE 2 DIABETES MELLITUS (HCC): ICD-10-CM

## 2021-01-01 DIAGNOSIS — R53.1 GENERALIZED WEAKNESS: ICD-10-CM

## 2021-01-01 DIAGNOSIS — E11.42 DIABETIC POLYNEUROPATHY ASSOCIATED WITH TYPE 2 DIABETES MELLITUS (HCC): ICD-10-CM

## 2021-01-01 DIAGNOSIS — E11.69 HYPERLIPIDEMIA ASSOCIATED WITH TYPE 2 DIABETES MELLITUS (HCC): ICD-10-CM

## 2021-01-01 DIAGNOSIS — Z78.9 DECREASED ACTIVITIES OF DAILY LIVING (ADL): ICD-10-CM

## 2021-01-01 DIAGNOSIS — I15.2 OBESITY, DIABETES, AND HYPERTENSION SYNDROME (HCC): ICD-10-CM

## 2021-01-01 DIAGNOSIS — Z11.59 SPECIAL SCREENING EXAMINATION FOR VIRAL DISEASE: Primary | ICD-10-CM

## 2021-01-01 DIAGNOSIS — J84.9 ILD (INTERSTITIAL LUNG DISEASE) (HCC): ICD-10-CM

## 2021-01-01 DIAGNOSIS — E56.9 VITAMIN DISEASE: Primary | ICD-10-CM

## 2021-01-01 DIAGNOSIS — E11.9 CONTROLLED TYPE 2 DIABETES MELLITUS WITHOUT COMPLICATION, WITHOUT LONG-TERM CURRENT USE OF INSULIN (HCC): ICD-10-CM

## 2021-01-01 DIAGNOSIS — Z17.0 MALIGNANT NEOPLASM OF UPPER-OUTER QUADRANT OF LEFT BREAST IN FEMALE, ESTROGEN RECEPTOR POSITIVE (HCC): ICD-10-CM

## 2021-01-01 DIAGNOSIS — R53.1 WEAKNESS: ICD-10-CM

## 2021-01-01 DIAGNOSIS — I15.2 HYPERTENSION ASSOCIATED WITH DIABETES (HCC): ICD-10-CM

## 2021-01-01 DIAGNOSIS — R53.81 PHYSICAL DECONDITIONING: ICD-10-CM

## 2021-01-01 DIAGNOSIS — T81.718A IATROGENIC PULMONARY EMBOLISM AND INFARCTION (HCC): Primary | ICD-10-CM

## 2021-01-01 DIAGNOSIS — I26.94 MULTIPLE SUBSEGMENTAL PULMONARY EMBOLI WITHOUT ACUTE COR PULMONALE (HCC): Primary | ICD-10-CM

## 2021-01-01 DIAGNOSIS — E11.59 OBESITY, DIABETES, AND HYPERTENSION SYNDROME (HCC): ICD-10-CM

## 2021-01-01 DIAGNOSIS — E66.9 OBESITY, DIABETES, AND HYPERTENSION SYNDROME (HCC): ICD-10-CM

## 2021-01-01 DIAGNOSIS — I10 ESSENTIAL HYPERTENSION, MALIGNANT: ICD-10-CM

## 2021-01-01 DIAGNOSIS — E11.59 HYPERTENSION ASSOCIATED WITH DIABETES (HCC): ICD-10-CM

## 2021-01-01 DIAGNOSIS — I70.0 AORTIC ATHEROSCLEROSIS (HCC): ICD-10-CM

## 2021-01-01 DIAGNOSIS — M85.89 OSTEOPENIA OF MULTIPLE SITES: ICD-10-CM

## 2021-01-01 DIAGNOSIS — R79.89 HYPOURICEMIA: ICD-10-CM

## 2021-01-01 DIAGNOSIS — E11.69 OBESITY, DIABETES, AND HYPERTENSION SYNDROME (HCC): ICD-10-CM

## 2021-01-01 DIAGNOSIS — C50.412 MALIGNANT NEOPLASM OF UPPER-OUTER QUADRANT OF LEFT BREAST IN FEMALE, ESTROGEN RECEPTOR POSITIVE (HCC): ICD-10-CM

## 2021-01-01 DIAGNOSIS — H43.813 PVD (POSTERIOR VITREOUS DETACHMENT), BILATERAL: ICD-10-CM

## 2021-01-01 PROCEDURE — 80053 COMPREHEN METABOLIC PANEL: CPT

## 2021-01-01 PROCEDURE — 82962 GLUCOSE BLOOD TEST: CPT

## 2021-01-01 PROCEDURE — 99306 1ST NF CARE HIGH MDM 50: CPT | Performed by: FAMILY MEDICINE

## 2021-01-01 PROCEDURE — 3078F DIAST BP <80 MM HG: CPT | Performed by: NURSE PRACTITIONER

## 2021-01-01 PROCEDURE — 83735 ASSAY OF MAGNESIUM: CPT | Performed by: INTERNAL MEDICINE

## 2021-01-01 PROCEDURE — 99308 SBSQ NF CARE LOW MDM 20: CPT | Performed by: NURSE PRACTITIONER

## 2021-01-01 PROCEDURE — 96372 THER/PROPH/DIAG INJ SC/IM: CPT

## 2021-01-01 PROCEDURE — 85027 COMPLETE CBC AUTOMATED: CPT

## 2021-01-01 PROCEDURE — 1124F ACP DISCUSS-NO DSCNMKR DOCD: CPT | Performed by: NURSE PRACTITIONER

## 2021-01-01 PROCEDURE — 83036 HEMOGLOBIN GLYCOSYLATED A1C: CPT

## 2021-01-01 PROCEDURE — 80048 BASIC METABOLIC PNL TOTAL CA: CPT | Performed by: INTERNAL MEDICINE

## 2021-01-01 PROCEDURE — 93306 TTE W/DOPPLER COMPLETE: CPT | Performed by: INTERNAL MEDICINE

## 2021-01-01 PROCEDURE — 97161 PT EVAL LOW COMPLEX 20 MIN: CPT

## 2021-01-01 PROCEDURE — 85025 COMPLETE CBC W/AUTO DIFF WBC: CPT | Performed by: INTERNAL MEDICINE

## 2021-01-01 PROCEDURE — 84132 ASSAY OF SERUM POTASSIUM: CPT | Performed by: INTERNAL MEDICINE

## 2021-01-01 PROCEDURE — 85025 COMPLETE CBC W/AUTO DIFF WBC: CPT

## 2021-01-01 PROCEDURE — 85007 BL SMEAR W/DIFF WBC COUNT: CPT

## 2021-01-01 PROCEDURE — 82306 VITAMIN D 25 HYDROXY: CPT

## 2021-01-01 PROCEDURE — 84484 ASSAY OF TROPONIN QUANT: CPT | Performed by: INTERNAL MEDICINE

## 2021-01-01 PROCEDURE — 83735 ASSAY OF MAGNESIUM: CPT

## 2021-01-01 PROCEDURE — 93970 EXTREMITY STUDY: CPT | Performed by: INTERNAL MEDICINE

## 2021-01-01 PROCEDURE — 97116 GAIT TRAINING THERAPY: CPT

## 2021-01-01 PROCEDURE — 99310 SBSQ NF CARE HIGH MDM 45: CPT | Performed by: NURSE PRACTITIONER

## 2021-01-01 PROCEDURE — 99316 NF DSCHRG MGMT 30 MIN+: CPT | Performed by: NURSE PRACTITIONER

## 2021-01-01 PROCEDURE — 82272 OCCULT BLD FECES 1-3 TESTS: CPT

## 2021-01-01 PROCEDURE — 3074F SYST BP LT 130 MM HG: CPT | Performed by: NURSE PRACTITIONER

## 2021-01-01 PROCEDURE — 36415 COLL VENOUS BLD VENIPUNCTURE: CPT

## 2021-01-01 RX ORDER — POTASSIUM CHLORIDE 20 MEQ/1
40 TABLET, EXTENDED RELEASE ORAL ONCE
Status: COMPLETED | OUTPATIENT
Start: 2021-01-01 | End: 2021-01-01

## 2021-01-01 RX ORDER — HYDROCHLOROTHIAZIDE 25 MG/1
25 TABLET ORAL DAILY
Status: DISCONTINUED | OUTPATIENT
Start: 2021-01-01 | End: 2021-01-01

## 2021-01-01 RX ORDER — OXYBUTYNIN CHLORIDE 5 MG/1
15 TABLET, EXTENDED RELEASE ORAL NIGHTLY
Status: DISCONTINUED | OUTPATIENT
Start: 2021-01-01 | End: 2021-01-01

## 2021-01-01 RX ORDER — ANASTROZOLE 1 MG/1
1 TABLET ORAL DAILY
Status: DISCONTINUED | OUTPATIENT
Start: 2021-01-01 | End: 2021-01-01

## 2021-01-01 RX ORDER — DEXTROSE MONOHYDRATE 25 G/50ML
50 INJECTION, SOLUTION INTRAVENOUS
Status: DISCONTINUED | OUTPATIENT
Start: 2021-01-01 | End: 2021-01-01

## 2021-01-01 RX ORDER — LOSARTAN POTASSIUM 50 MG/1
50 TABLET ORAL NIGHTLY
Status: DISCONTINUED | OUTPATIENT
Start: 2021-01-01 | End: 2021-01-01

## 2021-01-01 RX ORDER — CHOLECALCIFEROL (VITAMIN D3) 125 MCG
5 CAPSULE ORAL EVERY EVENING
COMMUNITY

## 2021-01-01 RX ORDER — ACETAMINOPHEN 325 MG/1
650 TABLET ORAL EVERY 6 HOURS PRN
COMMUNITY

## 2021-01-01 RX ORDER — ASPIRIN 81 MG/1
81 TABLET ORAL DAILY
Status: DISCONTINUED | OUTPATIENT
Start: 2021-01-01 | End: 2021-01-01

## 2021-01-01 RX ORDER — PANTOPRAZOLE SODIUM 40 MG/1
40 TABLET, DELAYED RELEASE ORAL
Refills: 3 | Status: DISCONTINUED | OUTPATIENT
Start: 2021-01-01 | End: 2021-01-01

## 2021-01-01 RX ORDER — ATORVASTATIN CALCIUM 10 MG/1
10 TABLET, FILM COATED ORAL NIGHTLY
Refills: 3 | Status: DISCONTINUED | OUTPATIENT
Start: 2021-01-01 | End: 2021-01-01

## 2021-01-01 RX ORDER — POLYETHYLENE GLYCOL 3350 17 G/17G
17 POWDER, FOR SOLUTION ORAL DAILY PRN
COMMUNITY

## 2021-01-01 RX ORDER — ENOXAPARIN SODIUM 100 MG/ML
1 INJECTION SUBCUTANEOUS EVERY 12 HOURS SCHEDULED
Status: DISCONTINUED | OUTPATIENT
Start: 2021-01-01 | End: 2021-01-01

## 2021-03-30 PROBLEM — I26.99 PULMONARY EMBOLI (HCC): Status: ACTIVE | Noted: 2021-01-01

## 2021-03-30 NOTE — CONSULTS
[address] [place] Patient Status:  Observation    10/27/1940 MRN [id number]   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       Family History:  Family History   Problem Relation Age of Onset   • Cancer Father         colon   • Cancer Mother         kidney   • Breast Cancer Niece 28        age at dx 28   • Breast Cancer Niece 28        age kg)  02/05/21 : 121 lb 6.4 oz (55.1 kg)       No intake/output data recorded.   No intake/output data recorded.     Physical Exam:                          General: alert, cooperative, in NAD                          HEENT: oropharynx clear without erythema

## 2021-03-30 NOTE — H&P
General Medicine H&P     No chief complaint on file.        PCP: Ila Steven MD    History of Present Illness: Patient is a 78year old female with PMH including but not limited to breast ca, DM, DVT (in chart), HTN, ILD, HL who p/t EH from [facility] c cou 2017    Performed by Maite Moore MD at [facility] ENDOSCOPY   •      • CHOLECYSTECTOMY     • COLONOSCOPY      tics- rpt    • COLONOSCOPY     • COLONOSCOPY N/A 9/3/2015    Performed by Milo Montesinos MD at [place] [address] (36.8 °C) (Oral)   Resp 20   Wt 207 lb 12.8 oz (94.3 kg)   LMP  (LMP Unknown)   SpO2 100%   BMI 36.81 kg/m²     Gen: NAD, A+O x 3  Neck: supple, no LAD  CV: rrr, +s1/s2, no murmors  Lungs: CTAB, no wheezes  Abd: s/nt/nd, +bs  LE: no c/e/e  Neuro: CN 2-12 i subpleural reticular densities and patchy groundglass opacities and traction bronchiectasis. There are also small areas of honeycombing including the left upper lobe and right lower lobe. The overall appearance has slightly progressed from the prior exams. HL  -statin    # GERD  -PPI, wean as o/p if appropriate  # LUTS  -oxybutynin    # HTN  -ARB, HCTZ  -on asa    # hypokalemia  -replete per protocol, 3.1    # Type 2 diabetes mellitus  -Hyperglycemic protocol with accu-checks QID and low-dose sliding scale i

## 2021-03-30 NOTE — PLAN OF CARE
Patient is alert and oriented, forgetful at times. Admitted to CTU 8 from [facility] care in [address] for chief complaint of SOB, CTA of chest found patient to have pulmonary embolism in bilateral lungs.  Given SubQ lovenox outpatient prior to direct adm

## 2021-03-31 NOTE — CM/SW NOTE
03/31/21 1100   CM/SW Referral Data   Referral Source Physician   Reason for Referral Discharge planning   Informant Patient; Other     Sw met with pt and her granddaughter to assess for dc needs. Pt admitted due to margarito PE.   Pt had recent fall at home a

## 2021-03-31 NOTE — PROGRESS NOTES
[facility] Hospitalist Team  Progress Note      Chelsea Ahr  10/27/1940    Assessment/Plan:     78year old female with PMH including but not limited to breast ca, DM, DVT (in chart), HTN, ILD, HL who p/t EH from [address] [place] [address] cough, SOB, diarrhea, fatigue, and fou respiratory effort  CV: Heart with regular rate and rhythm, no murmur  Abd: Abdomen soft, nontender, nondistended, bowel sounds present  MSK: No edema, no clubbing, no cyanosis  Skin: no rashes or lesions      Data:       Labs:   Recent Labs   Lab 03/30/21

## 2021-03-31 NOTE — DISCHARGE SUMMARY
General Medicine Discharge Summary     Patient ID:  Gibson Yap  78year old  10/27/1940    Admit date: 3/30/2021    Discharge date and time: 3/31/21    Attending Physician: Rosalba Wynne, cont anastrazole   # HL -statin   # GERD -PPI, wean as o/p if appropriate  # LUTS -oxybutynin   # HTN -ARB, hydrochlorothiazide -on asa  # hypokalemia -replete per protocol     # Type 2 diabetes mellitus  -Hyperglycemic protocol with accu-checks QID and lo External Cream  Apply to AA of trunk and extremities BID x up to 2 weeks, then hold 1-2 weeks. Repeat prn flares    ipratropium-albuterol 0.5-2.5 (3) MG/3ML Inhalation Solution  Take 3 mL by nebulization every 6 (six) hours as needed.           Activity: a

## 2021-03-31 NOTE — CM/SW NOTE
Patient failed Inpatient criteria. Second level of review completed and supports Observation. UR committee in agreement. OBS order entered by attending. Jose Griggs

## 2021-03-31 NOTE — PLAN OF CARE
Plan for discharge today  Awaiting echo results  Lovenox and transition to orals  Left arm and fall precautions  Small formed BM, cdiff iso discontinued  Follow up pulmonary in one week

## 2021-03-31 NOTE — PROGRESS NOTES
NURSING DISCHARGE NOTE    Discharged Home via Wheelchair. Accompanied by Family member  Belongings Taken by patient/family.     Patient to be discharged to the [place]  Report called to renetta  Copy of discharge instructions provided to patient   Alden

## 2021-03-31 NOTE — CM/SW NOTE
03/31/21 1400   Discharge disposition   Expected discharge disposition Skilled Nurs   Name of Azael Simpson Dr at [facility]   Discharge transportation Private car       Pt has been accepted at [facility] and plan is to dc today at 6pm kassie

## 2021-03-31 NOTE — PROGRESS NOTES
Pulmonary Progress Note     Assessment / Plan:  1. Acute bilateral pulmonary emboli - low clot burden on CTA chest. Normal troponin. LE doppler only notable for superficial thrombophlebitis.  She has a prior history of DVT  - TTE pending  - lovenox with karyn

## 2021-03-31 NOTE — PHYSICAL THERAPY NOTE
PHYSICAL THERAPY EVALUATION - INPATIENT     Room Number: 0525/3406-T  Evaluation Date: 3/31/2021  Type of Evaluation: Initial  Physician Order: PT Eval and Treat    Presenting Problem: SOB w/ PE  Reason for Therapy: Mobility Dysfunction and Discharge TRANSVAGINAL TAPING SUSPENSION URETHROLYSIS N/A 10/7/2013    Performed by Marcos Aguirre MD at [address] [place] [address] MAIN OR   • BRONCHOSCOPY N/A 2017    Performed by Gertrude Hartman MD at [address] [place] [address] ENDOSCOPY   •      • CHOLECYSTECTOMY     • COLONOSCOPY      tics- COGNITION  · Arousal/Alertness:  appropriate responses to stimuli  · Orientation Level:  oriented x4    RANGE OF MOTION AND STRENGTH ASSESSMENT  Upper extremity ROM and strength are within functional limits     Lower extremity ROM is within functional walk, resolved once sitting. /47 in standing and 98/49 in sitting. Second attempt decreased dizziness with ambulation, still fatiguing quickly.  Patient giving energy conservation strategies and breathing techniques with understanding verbalized and d (Obs): 3-5x/week  Number of Visits to Meet Established Goals: 4      CURRENT GOALS    Goal #1 Patient is able to demonstrate supine - sit EOB @ level: modified independent     Goal #2 Patient is able to demonstrate transfers Sit to/from Stand at assistance

## 2021-03-31 NOTE — PLAN OF CARE
Assumed care at [address]. Pt is A&Ox4. RA with sats 99%. NSR on tele. Incontinent at times, briefed. Denies pain at this time. Pt ambulates w/ SBA and cane. L arm precautions due to lumpectomy. QID sugar monitoring. Pt to [address] to r/o DVT.  All needs addr

## 2021-04-01 NOTE — PROGRESS NOTES
Indira Newman  : 10/27/1940  Age 78year old  female patient is admitted to Facility: The [address] [place] [address] for subacute rehab.     [facility] Admit date: 2021  Discharge date to [facility]: 2021  ELOS: 7 to 10 days  An lumpectomy, S/P RT to the left breast and low axilla hypofx, completing 1/25/17   • Cataract    • Controlled type 2 diabetes mellitus without complication, without long-term current use of insulin ([facility] 3/20/2019   • Deep vein thrombosis ([address] 1/2017    DV MD at [address] [place] [address]  10/7/13    Guillermo - EDW - JONELLE   • TONSILLECTOMY     • TOTAL KNEE REPLACEMENT Bilateral      Family History   Problem Relation Age of Onset   • Cancer Father         colon   • Cancer Mother Furoate (ARNUITY ELLIPTA) 100 MCG/ACT Inhalation Aerosol Powder, Breath Activated Inhale 1 puff into the lungs daily. 3 each 1   • anastrozole 1 MG Oral Tab tab Take 1 tablet (1 mg total) by mouth daily.  90 tablet 1   • triamcinolone acetonide 0.1 % Extern unexpected wt gain or wt loss  ALLERGY/IMM.: denies food or seasonal allergies      PHYSICAL EXAM:  GENERAL HEALTH: well developed, well nourished, in no apparent distress  LINES, TUBES, DRAINS:  none  SKIN: no rashes, no suspicious lesions, pink, warm, dr if positive:    Low hemoglobin at discharge (<12g/dl)                                [1]  Followed by Oncology service                                              [2]  Low sodium level at discharge (<135mEq/L)                        [1]  Procedure during imaging results reviewed. Medication reconciliation completed.         SEE PLAN BELOW    Fall and down for 16 hours, weakness, deconditioning  Safety precautions, fall prevention  PT/OT eval and treat  ELOS 7-10 days  DC 4/10 with rehab and SW evals    Alexander minimize errors. Low RISK FOR READMISSION     spent w/ patient and reviewing medical records, labs, completing medication reconciliation and entering orders to establish plan of care in [facility].     Kedar Enriquez, GRAEME  04/01/21   12:33 PM

## 2021-04-05 NOTE — PROGRESS NOTES
Jesse Chowdhury, 10/27/1940, 78year old, female    Chief Complaint:  Patient presents with:   Follow - Up: fall down 16 hours, PE/DVT, DM  Weakness       Subjective:    HPI:  Laura Dunn is an 78year old female with past medical history including diabetes, no wheezing, no dyspnea, dry cough, room air  CARDIOVASCULAR: RRR, S1 and S2, no murmurs, no edema  ABDOMEN:  normal active BS+, soft, nondistended; no organomegaly, no masses; nontender, no guarding, no rebound tenderness. Obese.   :Deferred  LYMPHATIC:n strain  Follow up with pulmonary in 3 months  CT progressive fibrotic changes  Hx steroids  Albuterol HFA 2 puffs every 6 as needed   Arnuity Ellipta 100 mcg 1 puff daily  Duo nebs every 6 as needed  No hypoxia monitoring O2 sats and respiratory assessment

## 2021-04-08 NOTE — PROGRESS NOTES
Uyen Rm, 10/27/1940, 78year old, female is being discharged from [address] [place] [address] at [address]    Date of Admission: 3/31/21    Date of Discharge:4/9/21                                 Admitting Diagnoses:  Mando Forrest edema  ABDOMEN:  normal active BS+, soft, nondistended; no organomegaly, no masses; nontender, no guarding, no rebound tenderness. Obese.   :Deferred  LYMPHATIC:no lymphedema  MUSCULOSKELETAL: no acute synovitis upper or lower extremity  EXTREMITIES/VASCU days  DC 4/10 with rehab and SW evals     Bilateral Pulmonary Emboli, hx ILD, shortness of breath  Eliquis 10 mg twice daily for 7 days then 5 mg twice daily until May 7, 2021  Dopplers legs negative inpt  ECHO no RV strain  Follow up with pulmonary in 3 m READMISSION     Medication Reconciliation Completed:  Yes    [id number] spent in coordination of care in preparation for discharge.     Jazlyn Llamas, APRN  4/8/2021  12:36 PM

## 2021-04-11 PROBLEM — R53.81 PHYSICAL DECONDITIONING: Status: ACTIVE | Noted: 2021-01-01

## 2021-04-11 PROBLEM — R53.1 GENERALIZED WEAKNESS: Status: ACTIVE | Noted: 2021-01-01

## 2021-04-11 NOTE — PROGRESS NOTES
HPI/Subjective:   Jeff Topete is a 78year old female who presents for Follow - Up (PE with ILD )   Presenting to [facility] with recent history of PE with ILD, with history of breast cancer, DM, DVT with transition from lovenox to oral eliquis.    Patient's Albuterol Sulfate HFA (PROAIR HFA) 108 (90 Base) MCG/ACT Inhalation Aero Soln Inhale 2 puffs into the lungs every 6 (six) hours as needed.  1 Inhaler 1   • Desoximetasone 0.25 % External Cream Apply to AA of trunk and extremities BID x up to 2 weeks, then h upper-outer quadrant of left breast in female, estrogen receptor positive ([address]  Hyperlipidemia associated with type 2 diabetes mellitus ([address]  Hypertension associated with diabetes ([address]  ILD (interstitial lung disease) ([address]  OAB (overactive bladder)  Os

## (undated) DEVICE — LENS FRAMES DISP EYEWEAR

## (undated) DEVICE — SINGLE USE BIOPSY VALVE MAJ-210: Brand: SINGLE USE BIOPSY VALVE (STERILE)

## (undated) DEVICE — BOWLS UTILITY 16OZ

## (undated) DEVICE — 60 ML SYRINGE REGULAR TIP: Brand: MONOJECT

## (undated) DEVICE — CONTAINER PREFILL FORMALIN 40

## (undated) DEVICE — 1200CC GUARDIAN II: Brand: GUARDIAN

## (undated) DEVICE — FORCEPS BX 100CM 1.8MM RJ STD

## (undated) DEVICE — ENDOSCOPY PACK UPPER: Brand: MEDLINE INDUSTRIES, INC.

## (undated) DEVICE — FILTERLINE NASAL ADULT O2/CO2

## (undated) DEVICE — CONMED DISPOSABLE MICROBIOLOGY BRUSH, Ø1 MM, 1.8 MM WORKING DIAMETER, 110 CM LENGTH: Brand: CONMED

## (undated) DEVICE — SINGLE USE SUCTION VALVE MAJ-209: Brand: SINGLE USE SUCTION VALVE (STERILE)

## (undated) DEVICE — MEDI-VAC NON-CONDUCTIVE SUCTION TUBING: Brand: CARDINAL HEALTH

## (undated) DEVICE — GLOVE SURG TRIUMPH SZ 71/2

## (undated) DEVICE — MEDI-VAC SUCTION HANDLE REGULAR CAPACITY: Brand: CARDINAL HEALTH

## (undated) DEVICE — MASK ISOLATION

## (undated) DEVICE — LENS PLASTIC DISP EYEWEAR

## (undated) DEVICE — SYRINGE 10ML SLIP TIP

## (undated) DEVICE — 3M™ RED DOT™ MONITORING ELECTRODE WITH FOAM TAPE AND STICKY GEL, 50/BAG, 20/CASE, 72/PLT 2570: Brand: RED DOT™

## (undated) NOTE — IP AVS SNAPSHOT
Patient Demographics     Address  [address] [place] [id number] Phone  [phone] (Home) *Preferred*  [phone] [facility]) E-mail Address  [email or web address]      Emergency Contact(s)     Name Relation Home Work Mobile    Inna Quispe hold 1-2 weeks. Repeat prn flares   MARY Ann         hydrochlorothiazide 25 MG Tabs  Commonly known as: HYDRODIURIL      Take 1 tablet (25 mg total) by mouth daily.    Deborah Beach MD         ipratropium-albuterol 0.5-2.5 (3) MG/3ML Soln (DEFINITY) 6.52 MG/ML injection 1.5 mL 03/31/21 1007 Given      [id number] Potassium Chloride ER (K-DUR M20) CR tab 40 mEq 03/30/21 1751 Given      [id number] Potassium Chloride ER (K-DUR M20) CR tab 40 mEq 03/31/21 0830 Given      [id number] anastrozole ([facility] ([facility])    Specimen Information    Type Source Collected On   Blood — 03/31/21 0507          Components    Component Value Reference Range Flag Lab   Slide Review Slide reviewed, normal WBC, RBC, and platelet morphology [facility])   GFR, Non- 81 >=60 — [employer] ([facility])   GFR, -American 93 >=60 [facility] [facility])            MAGNESIUM [[id number]] (Normal)  Resulted: 03/31/21 0555, Result status: Final result   Ordering provider:  Rafael Wasserman consumption at least 72 hours prior to collection of a new sample. Testing Performed By     Lab - Abbreviation Name Director Address Valid Date Range    [facility]) [facility] negative    • Breast cancer ([facility]     s/p lumpectomy, S/P RT to the left breast and low axilla hypofx, completing 1/25/17   • Cataract    • Controlled type 2 diabetes mellitus without complication, without long-term current use of insulin ([facility] 3/20/2019 3/26/2014    Performed by Musa Salinas MD at [facility]  10/7/13    [facility] - EDW - JONELLE   • TONSILLECTOMY     • TOTAL KNEE REPLACEMENT Bilateral         ALL:  No Known Allergies     enoxaparin, 1 mg/kg, 2 t plane was performed. 80 mL of Isovue 370 were administered intravenously. Automated exposure control and ALARA manual techniques for patient specific dose reduction were followed while maintaining the necessary diagnostic image quality.  ADVERSE REACTION: enlarged mediastinal and bilateral hilar lymph nodes. Consider follow-up exam in 6 months 4.  Small hiatal hernia Findings were discussed with Dr. Jaime Webster on 3/30/2021 at 12:41 PM         ASSESSMENT / PLAN:    78year old female with PMH including but not li H&P signed by Jonatan Beck MD at 3/30/2021  4:42 PM  Version 1 of 2    Author: Jonatan Beck MD Service: Hospitalist Author Type: Physician    Filed: 3/30/2021  4:42 PM Date of Service: 3/30/2021  3:39 PM Status: Signed    :  Jamel Ordonez (interstitial lung disease) ([facility]    • ILD (interstitial lung disease) ([facility] 2/28/2018   • KIDNEY STONE     lithotripsy   • OAB (overactive bladder)       Past Surgical History:   Procedure Laterality Date   • ANGIOGRAM     • APPENDECTOMY     • BENIGN BIOP Problem Relation Age of Onset   • Cancer Father         colon   • Cancer Mother         kidney   • Breast Cancer Niece 28        age at dx 28   • Breast Cancer Niece 28        age at dx 28   • Breast Cancer Niece 35        age at dx 35       Review of [de-identified] normal in caliber. The main pulmonary artery is dilated compatible with pulmonary arterial hypertension.  MEDIASTINUM AND DWAYNE:There are several mildly prominent mediastinal and bilateral hilar lymph nodes measuring up to to 1.2 cm slightly increased from t - CT c progressive fibrotic changes  - previously on steriods  - o/p inhalers, arnuity    # Cough/sputum  - likely from PE, possible infaract, and ILD; reports chronic cough although worsening  - monitor for any s/s pna; follow temps; WBC wnl    # Barb Castellano DVT[KW.1], ILD previously on prednisone, off since 11/2017[KW. 2] who presented to [facility] today with complaints of cough, generalized fatigue and[KW.1] progressive[KW. 2] SOB. [KW.1]  Pt states that symptoms started on Wednesday, and progressed to where she was Past Surgical History:   Procedure Laterality Date   • ANGIOGRAM     • APPENDECTOMY     • BENIGN BIOPSY RIGHT  2000   • BLADDER TRANSVAGINAL TAPING SUSPENSION URETHROLYSIS N/A 10/7/2013    Performed by Stuart De La Garza MD at [address] [place] [address] OR   • BRONCHOSCOPY N/A 6/7 3/30/21 encounter [facility] Encounter). Current Medications:  No current facility-administered medications for this encounter.      Review of Systems:  Constitutional: denies weight loss, fevers, chills, night sweats,[KW.1] +[KW.2] fatigue  HEENT: den auscultation bilaterally, no wheezes or crackles                           Chest wall: No tenderness or deformity.                           AXBML: UQRXZHF rate and rhythm, normal S1S2                          Abdomen: soft, non-tender, non-distended, posit Dysfunction and Discharge Planning    History related to current admission: 78year old female with PMH including but not limited to breast ca, DM, DVT (in chart), HTN, ILD, HL who p/t EH from [address] [place] [address] Way c cough, SOB, diarrhea, fatigue, and found to have PE.     • CHOLECYSTECTOMY     • COLONOSCOPY  2007    tics- rpt 2012   • COLONOSCOPY     • COLONOSCOPY N/A 9/3/2015    Performed by Eagle Mason MD at [facility] ENDOSCOPY   • CYSTOSCOPY CHEMODENERVATION  10/13/2020    100U Botox   • CYSTOSCOPY CHEMODENERVATION  03/2 extremity ROM and strength are within functional limits     Lower extremity ROM is within functional limits     Lower extremity strength is grossly 5-/5    BALANCE[BS.1]  Static Sitting: Good  Dynamic Sitting: Good  Static Standing: Fair +  Dynamic Standin Second attempt decreased dizziness with ambulation, still fatiguing quickly. Patient giving energy conservation strategies and breathing techniques with understanding verbalized and demonstrated.        Exercise/Education Provided:  Energy conservation  [employer] Visits to Meet Established Goals: 4[BS. 2]      CURRENT GOALS    Goal #1 Patient is able to demonstrate supine - sit EOB @ level: modified independent     Goal #2 Patient is able to demonstrate transfers Sit to/from Stand at assistance level: modified indep Ophthalmology - Sukhdeep Gomez, Om[facility] [address]      Multidisciplinary Problems     Active Goals     Not on file

## (undated) NOTE — LETTER
March 9, 2018          To Whom It May Concern:        Ms. Ranjith Tracey may resume pulmonary rehab without restrictions.                [place] PA-C / Caden Butts MD  [facility]

## (undated) NOTE — IP AVS SNAPSHOT
[facility] [place] [address] [place], [address] ~ [phone]                Discharge Summary   6/7/2017    Darin Mitchell           Admission Information        Provider Department    6/7/2017 Teddy Garcia MD  Endoscopy Commonly known as:  HYDRODIURIL        Take 1 tablet (25 mg total) by mouth daily. Bard Desir     [    ]    [    ]    [    ]    [    ]       Lovastatin 40 MG Tabs        Take 1 tablet (40 mg total) by mouth nightly.     Bard Desir     [    ]    [ - Do not operate any machinery today (including kitchen equipment).     What to Expect:  - A sore throat  - A cough  - Hoarseness  - A small amount of blood in your sputum    Contact Your Doctor If:  - You have difficulty breathing  - You have chest pain  - harming yourself, contact [facility] at [phone]. - If you don’t have insurance, Abimael Nogueira has partnered with [facility] [address] to help you get signed up for insurance coverage.   [facility] [place]